# Patient Record
Sex: MALE | Race: WHITE
[De-identification: names, ages, dates, MRNs, and addresses within clinical notes are randomized per-mention and may not be internally consistent; named-entity substitution may affect disease eponyms.]

---

## 2019-06-13 ENCOUNTER — HOSPITAL ENCOUNTER (EMERGENCY)
Dept: HOSPITAL 56 - MW.ED | Age: 49
Discharge: HOME | End: 2019-06-13
Payer: SELF-PAY

## 2019-06-13 DIAGNOSIS — L30.9: Primary | ICD-10-CM

## 2019-06-13 NOTE — EDM.PDOC
ED HPI GENERAL MEDICAL PROBLEM





- General


Chief Complaint: Skin Complaint


Stated Complaint: RASH


Time Seen by Provider: 06/13/19 20:59


Source of Information: Reports: Patient


History Limitations: Reports: No Limitations





- History of Present Illness


INITIAL COMMENTS - FREE TEXT/NARRATIVE: 


HISTORY AND PHYSICAL:





History of present illness:


Patient is a 48-year-old male who presents to the ED today with concern of a 

generalized rash that occurred over the past 3 days. Patient states he has 

recently moved to a new apartment that had a bed already in it. Patient states 

since leaving into his apartment is from the rash had started a few days ago. 

Patient states he is seen bedbugs in his bed and is concerned that these are 

bed bug bites. Patient states he had told the apartment  who had 

came in and did a self treatment for bedbugs. Patient denies any other symptoms 

at this time or any other concerns.





Patient denies fever, chills, chest pain, shortness of breath, or cough. Denies 

headache, neck stiff ness, change in vision, syncope, or near syncope. Denies 

nausea, vomiting, abdominal pain, diarrhea, constipation, or dysuria. Has not 

noted any blood in urine or stool. Patient has been eating and drinking 

appropriately.





Review of systems: 


As per history of present illness and below otherwise all systems reviewed and 

negative.





Past medical history: 


As per history of present illness and as reviewed below otherwise 

noncontributory.





Surgical history: 


As per history of present illness and as reviewed below otherwise 

noncontributory.





Social history: 


See social history for further information





Family history: 


As per history of present illness and as reviewed below otherwise 

noncontributory.





Physical exam:


General: Patient is alert, oriented, and in no acute distress. Patient sitting 

comfortably on exam table.


HEENT: Atraumatic, normocephalic, pupils equal and reactive bilaterally, 

negative for conjunctival pallor or scleral icterus, mucous membranes moist, 

TMs normal bilaterally, throat clear, neck supple, nontender, trachea midline. 

No drooling or trismus noted. No meningeal signs. No hot potato voice noted. 


Lungs: Clear to auscultation, breath sounds equal bilaterally, chest nontender.


Heart: S1S2, regular rate and rhythm without overt murmur


Abdomen: Soft, nondistended, nontender. Negative for masses or 

hepatosplenomegaly. Negative for costovertebral tenderness.


Pelvis: Stable nontender.


Genitourinary: Deferred.


Rectal: Deferred.


Skin: Generalized macular papular dermatitis of the complete body. No signs of 

infection, edema, or breakage of the skin.


Extremities: Atraumatic, negative for cords or calf pain. Neurovascular 

unremarkable.


Neuro: Awake, alert, oriented. Cranial nerves II through XII unremarkable. 

Cerebellum unremarkable. Motor and sensory unremarkable throughout. Exam 

nonfocal.





Notes:


Discussed the importance for follow-up with primary care provider and the need 

to give her to the bedbugs properly. Voices understanding and is agreeable to 

plan of care. Denies any further questions or concerns at this time.





Diagnostics:


None





Therapeutics:


None





Prescription:


None





Impression: 


Dermatitis, unspecified


Possible bed bug exposure





Plan:


1. Alternate ibuprofen and Tylenol as directed for pain and discomfort.


2. Exterminate for bedbugs as discussed. Follow up with her primary care 

provider as discussed. Return to the ED as needed and as discussed.





Definitive disposition and diagnosis as appropriate pending reevaluation and 

review of above.





  ** Generalized


Pain Score (Numeric/FACES): 5





- Related Data


 Allergies











Allergy/AdvReac Type Severity Reaction Status Date / Time


 


No Known Allergies Allergy   Verified 06/13/19 20:55











Home Meds: 


 Home Meds





. [No Known Home Meds]  10/30/18 [History]











Past Medical History





- Past Health History


Medical/Surgical History: Denies Medical/Surgical History





- Infectious Disease History


Infectious Disease History: Reports: Chicken Pox





Social & Family History





- Family History


Family Medical History: Noncontributory





- Tobacco Use


Smoking Status *Q: Current Every Day Smoker


Years of Tobacco use: 33


Packs/Tins Daily: 1





- Caffeine Use


Caffeine Use: Reports: Coffee





- Recreational Drug Use


Recreational Drug Use: No





ED ROS GENERAL





- Review of Systems


Review Of Systems: ROS reveals no pertinent complaints other than HPI.





ED EXAM, SKIN/RASH


Exam: See Below (See dictation)





Course





- Vital Signs


Last Recorded V/S: 


 Last Vital Signs











Temp  36.4 C   06/13/19 21:20


 


Pulse  80   06/13/19 21:20


 


Resp  18   06/13/19 21:20


 


BP  112/60   06/13/19 21:20


 


Pulse Ox  96   06/13/19 21:20














Departure





- Departure


Time of Disposition: 21:12


Disposition: Home, Self-Care 01


Clinical Impression: 


 Dermatitis








- Discharge Information


Instructions:  Bedbugs, Easy-to-Read


Referrals: 


PCP,None [Primary Care Provider] - 


Forms:  ED Department Discharge


Additional Instructions: 


The following information is given to patients seen in the emergency department 

who are being discharged to home. This information is to outline your options 

for follow-up care. We provide all patients seen in our emergency department 

with a follow-up referral.





The need for follow-up, as well as the timing and circumstances, are variable 

depending upon the specifics of your emergency department visit.





If you don't have a primary care physician on staff, we will provide you with a 

referral. We always advise you to contact your personal physician following an 

emergency department visit to inform them of the circumstance of the visit and 

for follow-up with them and/or the need for any referrals to a consulting 

specialist.





The emergency department will also refer you to a specialist when appropriate. 

This referral assures that you have the opportunity for follow-up care with a 

specialist. All of these measure are taken in an effort to provide you with 

optimal care, which includes your follow-up.





Under all circumstances we always encourage you to contact your private 

physician who remains a resource for coordinating your care. When calling for 

follow-up care, please make the office aware that this follow-up is from your 

recent emergency room visit. If for any reason you are refused follow-up, 

please contact the [facility] Emergency 

Department at [phone] and asked to speak to the emergency department 

charge nurse.





[facility]


Primary Care


[address] [place] [address]


Phone: [phone]


Fax: [phone]





[facility]


[address] [place] [address]


Phone: [phone]


Fax: [phone]





1. Alternate ibuprofen and Tylenol as directed for pain and discomfort.


2. Exterminate for bedbugs as discussed. Follow up with her primary care 

provider as discussed. Return to the ED as needed and as discussed.

## 2019-09-23 ENCOUNTER — HOSPITAL ENCOUNTER (EMERGENCY)
Dept: HOSPITAL 56 - MW.ED | Age: 49
Discharge: HOME | End: 2019-09-23
Payer: SELF-PAY

## 2019-09-23 DIAGNOSIS — F17.210: ICD-10-CM

## 2019-09-23 DIAGNOSIS — J40: Primary | ICD-10-CM

## 2019-09-23 LAB
BUN SERPL-MCNC: 12 MG/DL (ref 7–18)
CHLORIDE SERPL-SCNC: 104 MMOL/L (ref 98–107)
CO2 SERPL-SCNC: 27.3 MMOL/L (ref 21–32)
GLUCOSE SERPL-MCNC: 87 MG/DL (ref 74–106)
POTASSIUM SERPL-SCNC: 3.9 MMOL/L (ref 3.5–5.1)
SODIUM SERPL-SCNC: 140 MMOL/L (ref 136–148)

## 2019-09-23 PROCEDURE — 87040 BLOOD CULTURE FOR BACTERIA: CPT

## 2019-09-23 PROCEDURE — 81003 URINALYSIS AUTO W/O SCOPE: CPT

## 2019-09-23 PROCEDURE — 99285 EMERGENCY DEPT VISIT HI MDM: CPT

## 2019-09-23 PROCEDURE — 85025 COMPLETE CBC W/AUTO DIFF WBC: CPT

## 2019-09-23 PROCEDURE — 85610 PROTHROMBIN TIME: CPT

## 2019-09-23 PROCEDURE — 71045 X-RAY EXAM CHEST 1 VIEW: CPT

## 2019-09-23 PROCEDURE — 36415 COLL VENOUS BLD VENIPUNCTURE: CPT

## 2019-09-23 PROCEDURE — 85379 FIBRIN DEGRADATION QUANT: CPT

## 2019-09-23 PROCEDURE — 84484 ASSAY OF TROPONIN QUANT: CPT

## 2019-09-23 PROCEDURE — 80053 COMPREHEN METABOLIC PANEL: CPT

## 2019-09-23 PROCEDURE — 96360 HYDRATION IV INFUSION INIT: CPT

## 2019-09-23 NOTE — EDM.PDOC
ED HPI GENERAL MEDICAL PROBLEM





- General


Chief Complaint: Chest Pain


Stated Complaint: PT HAS COLD


Time Seen by Provider: 09/23/19 18:15





- History of Present Illness


INITIAL COMMENTS - FREE TEXT/NARRATIVE: 





HISTORY AND PHYSICAL:





History of present illness:


Patient's 49-year-old male presents with concern of cough and cold symptoms 

along with right-sided chest pain worse with coughing over last several weeks 

he equivocates regarding any shortness of breath and no diaphoresis 

palpitations nausea vomiting. He denies fever chills he does have a 30-pack-

year history of smoking





Review of systems: 


As per history of present illness and below otherwise all systems reviewed and 

negative.





Past medical history: 


As per history of present illness and as reviewed below otherwise 

noncontributory.





Surgical history: 


As per history of present illness and as reviewed below otherwise 

noncontributory.





Social history: 


No reported history of drug or alcohol abuse.





Family history: 


As per history of present illness and as reviewed below otherwise 

noncontributory.





Physical exam:


HEENT: Atraumatic, normocephalic, pupils reactive, negative for conjunctival 

pallor or scleral icterus, mucous membranes moist, throat clear, neck supple, 

nontender, trachea midline.


Lungs: Clear to auscultation, breath sounds equal bilaterally, chest nontender.


Heart: S1S2, regular, negative for clicks, rubs, or JVD.


Abdomen: Soft, nondistended, nontender. Negative for masses or 

hepatosplenomegaly. Negative for costovertebral tenderness.


Pelvis: Stable nontender.


Genitourinary: Deferred.


Rectal: Deferred.


Extremities: Atraumatic, negative for cords or calf pain. Neurovascular 

unremarkable.


Neuro: Awake, alert, oriented. Cranial nerves II through XII unremarkable. 

Cerebellum unremarkable. Motor and sensory unremarkable throughout. Exam 

nonfocal.





Diagnostics:


CBC CMP troponin PT/INR d-dimer chest x-ray EKG





Therapeutics:


Saline at 125 mL an hour cardiac monitor





Impression: 


#1 tracheobronchitis





Definitive disposition and diagnosis as appropriate pending reevaluation and 

review of above.


  ** Right Upper Chest


Pain Score (Numeric/FACES): 7





- Related Data


 Allergies











Allergy/AdvReac Type Severity Reaction Status Date / Time


 


No Known Allergies Allergy   Verified 09/23/19 18:19











Home Meds: 


 Home Meds





. [No Known Home Meds]  10/30/18 [History]











Past Medical History





- Past Health History


Medical/Surgical History: Denies Medical/Surgical History





- Infectious Disease History


Infectious Disease History: Reports: Chicken Pox





Social & Family History





- Family History


Family Medical History: Noncontributory





- Tobacco Use


Smoking Status *Q: Current Every Day Smoker


Years of Tobacco use: 30


Packs/Tins Daily: 1





- Caffeine Use


Caffeine Use: Reports: Coffee





- Recreational Drug Use


Recreational Drug Use: Yes


Drug Use in Last 12 Months: Yes


Recreational Drug Type: Reports: Marijuana/Hashish


Recreational Drug Use Frequency: Socially





ED ROS GENERAL





- Review of Systems


Review Of Systems: ROS reveals no pertinent complaints other than HPI.





ED EXAM, GENERAL





- Physical Exam


Exam: See Below (See dictation)





Course





- Vital Signs


Last Recorded V/S: 


 Last Vital Signs











Temp  36.7 C   09/23/19 18:16


 


Pulse  84   09/23/19 18:39


 


Resp  18   09/23/19 18:39


 


BP  135/91 H  09/23/19 18:39


 


Pulse Ox  96   09/23/19 18:39














- Orders/Labs/Meds


Orders: 


 Active Orders 24 hr











 Category Date Time Status


 


 Cardiac Monitoring [RC] .AS DIRECTED Care  09/23/19 18:17 Active


 


 EKG Documentation Completion [RC] STAT Care  09/23/19 18:17 Active


 


 Pulse Oximetry [RC] ASDIRECTED Care  09/23/19 18:17 Active


 


 CULTURE BLOOD [BC] Stat Lab  09/23/19 18:30 Received


 


 CULTURE BLOOD [BC] Stat Lab  09/23/19 18:43 Received


 


 Sodium Chloride 0.9% [Normal Saline] 1,000 ml Med  09/23/19 18:30 Active





 IV STAT   


 


 Sodium Chloride 0.9% [Saline Flush] Med  09/23/19 18:19 Active





 10 ml FLUSH ASDIRECTED PRN   


 


 Sodium Chloride 0.9% [Saline Flush] Med  09/23/19 18:19 Active





 2.5 ml FLUSH ASDIRECTED PRN   


 


 Blood Culture x2 Reflex Set [OM.PC] Stat Oth  09/23/19 18:18 Ordered


 


 Saline Lock Insert [OM.PC] Stat Oth  09/23/19 18:17 Ordered








 Medication Orders





Sodium Chloride (Normal Saline)  1,000 mls @ 125 mls/hr IV STAT MURALI


   Last Admin: 09/23/19 18:34  Dose: 125 mls/hr


Sodium Chloride (Saline Flush)  10 ml FLUSH ASDIRECTED PRN


   PRN Reason: Keep Vein Open


Sodium Chloride (Saline Flush)  2.5 ml FLUSH ASDIRECTED PRN


   PRN Reason: Keep Vein Open








Labs: 


 Laboratory Tests











  09/23/19 09/23/19 09/23/19 Range/Units





  18:18 18:30 18:30 


 


WBC   9.32   (4.0-11.0)  K/uL


 


RBC   4.99   (4.50-5.90)  M/uL


 


Hgb   16.0   (13.0-17.0)  g/dL


 


Hct   46.3   (38.0-50.0)  %


 


MCV   92.8   (80.0-98.0)  fL


 


MCH   32.1 H   (27.0-32.0)  pg


 


MCHC   34.6   (31.0-37.0)  g/dL


 


RDW Std Deviation   50.1   (28.0-62.0)  fl


 


RDW Coeff of Ana Paula   15   (11.0-15.0)  %


 


Plt Count   211   (150-400)  K/uL


 


MPV   9.60   (7.40-12.00)  fL


 


Neut % (Auto)   56.9   (48.0-80.0)  %


 


Lymph % (Auto)   32.8   (16.0-40.0)  %


 


Mono % (Auto)   6.9   (0.0-15.0)  %


 


Eos % (Auto)   3.2   (0.0-7.0)  %


 


Baso % (Auto)   0.2   (0.0-1.5)  %


 


Neut # (Auto)   5.3   (1.4-5.7)  K/uL


 


Lymph # (Auto)   3.1 H   (0.6-2.4)  K/uL


 


Mono # (Auto)   0.6   (0.0-0.8)  K/uL


 


Eos # (Auto)   0.3   (0.0-0.7)  K/uL


 


Baso # (Auto)   0.0   (0.0-0.1)  K/uL


 


Nucleated RBC %   0.0   /100WBC


 


Nucleated RBCs #   0   K/uL


 


INR     


 


D-Dimer, Quantitative    0.25  (0.0-0.50)  mg/L FEU


 


Sodium     (136-148)  mmol/L


 


Potassium     (3.5-5.1)  mmol/L


 


Chloride     ()  mmol/L


 


Carbon Dioxide     (21.0-32.0)  mmol/L


 


BUN     (7.0-18.0)  mg/dL


 


Creatinine     (0.8-1.3)  mg/dL


 


Est Cr Clr Drug Dosing     mL/min


 


Estimated GFR (MDRD)     ml/min


 


Glucose     ()  mg/dL


 


Calcium     (8.5-10.1)  mg/dL


 


Total Bilirubin     (0.2-1.0)  mg/dL


 


AST     (15-37)  IU/L


 


ALT     (14-63)  IU/L


 


Alkaline Phosphatase     ()  U/L


 


Troponin I     (0.000-0.056)  ng/mL


 


Total Protein     (6.4-8.2)  g/dL


 


Albumin     (3.4-5.0)  g/dL


 


Globulin     (2.6-4.0)  g/dL


 


Albumin/Globulin Ratio     (0.9-1.6)  


 


Urine Color  YELLOW    


 


Urine Appearance  CLEAR    


 


Urine pH  6.0    (5.0-8.0)  


 


Ur Specific Gravity  1.020    (1.001-1.035)  


 


Urine Protein  NEGATIVE    (NEGATIVE)  mg/dL


 


Urine Glucose (UA)  NEGATIVE    (NEGATIVE)  mg/dL


 


Urine Ketones  NEGATIVE    (NEGATIVE)  mg/dL


 


Urine Occult Blood  NEGATIVE    (NEGATIVE)  


 


Urine Nitrite  NEGATIVE    (NEGATIVE)  


 


Urine Bilirubin  NEGATIVE    (NEGATIVE)  


 


Urine Urobilinogen  0.2    (<2.0)  EU/dL


 


Ur Leukocyte Esterase  NEGATIVE    (NEGATIVE)  














  09/23/19 09/23/19 Range/Units





  18:30 18:30 


 


WBC    (4.0-11.0)  K/uL


 


RBC    (4.50-5.90)  M/uL


 


Hgb    (13.0-17.0)  g/dL


 


Hct    (38.0-50.0)  %


 


MCV    (80.0-98.0)  fL


 


MCH    (27.0-32.0)  pg


 


MCHC    (31.0-37.0)  g/dL


 


RDW Std Deviation    (28.0-62.0)  fl


 


RDW Coeff of Ana Paula    (11.0-15.0)  %


 


Plt Count    (150-400)  K/uL


 


MPV    (7.40-12.00)  fL


 


Neut % (Auto)    (48.0-80.0)  %


 


Lymph % (Auto)    (16.0-40.0)  %


 


Mono % (Auto)    (0.0-15.0)  %


 


Eos % (Auto)    (0.0-7.0)  %


 


Baso % (Auto)    (0.0-1.5)  %


 


Neut # (Auto)    (1.4-5.7)  K/uL


 


Lymph # (Auto)    (0.6-2.4)  K/uL


 


Mono # (Auto)    (0.0-0.8)  K/uL


 


Eos # (Auto)    (0.0-0.7)  K/uL


 


Baso # (Auto)    (0.0-0.1)  K/uL


 


Nucleated RBC %    /100WBC


 


Nucleated RBCs #    K/uL


 


INR   0.91  


 


D-Dimer, Quantitative    (0.0-0.50)  mg/L FEU


 


Sodium  140   (136-148)  mmol/L


 


Potassium  3.9   (3.5-5.1)  mmol/L


 


Chloride  104   ()  mmol/L


 


Carbon Dioxide  27.3   (21.0-32.0)  mmol/L


 


BUN  12   (7.0-18.0)  mg/dL


 


Creatinine  1.2   (0.8-1.3)  mg/dL


 


Est Cr Clr Drug Dosing  85.08   mL/min


 


Estimated GFR (MDRD)  > 60.0   ml/min


 


Glucose  87   ()  mg/dL


 


Calcium  8.7   (8.5-10.1)  mg/dL


 


Total Bilirubin  0.4   (0.2-1.0)  mg/dL


 


AST  21   (15-37)  IU/L


 


ALT  32   (14-63)  IU/L


 


Alkaline Phosphatase  58   ()  U/L


 


Troponin I  < 0.050   (0.000-0.056)  ng/mL


 


Total Protein  7.6   (6.4-8.2)  g/dL


 


Albumin  3.8   (3.4-5.0)  g/dL


 


Globulin  3.8   (2.6-4.0)  g/dL


 


Albumin/Globulin Ratio  1.0   (0.9-1.6)  


 


Urine Color    


 


Urine Appearance    


 


Urine pH    (5.0-8.0)  


 


Ur Specific Gravity    (1.001-1.035)  


 


Urine Protein    (NEGATIVE)  mg/dL


 


Urine Glucose (UA)    (NEGATIVE)  mg/dL


 


Urine Ketones    (NEGATIVE)  mg/dL


 


Urine Occult Blood    (NEGATIVE)  


 


Urine Nitrite    (NEGATIVE)  


 


Urine Bilirubin    (NEGATIVE)  


 


Urine Urobilinogen    (<2.0)  EU/dL


 


Ur Leukocyte Esterase    (NEGATIVE)  











Meds: 


Medications











Generic Name Dose Route Start Last Admin





  Trade Name Freq  PRN Reason Stop Dose Admin


 


Sodium Chloride  1,000 mls @ 125 mls/hr  09/23/19 18:30  09/23/19 18:34





  Normal Saline  IV   125 mls/hr





  STAT MURALI   Administration





     





     





     





     


 


Sodium Chloride  10 ml  09/23/19 18:19  





  Saline Flush  FLUSH   





  ASDIRECTED PRN   





  Keep Vein Open   





     





     





     


 


Sodium Chloride  2.5 ml  09/23/19 18:19  





  Saline Flush  FLUSH   





  ASDIRECTED PRN   





  Keep Vein Open   





     





     





     














Departure





- Departure


Time of Disposition: 19:26


Disposition: Home, Self-Care 01


Condition: Good


Clinical Impression: 


 Tracheobronchitis








- Discharge Information


Forms:  ED Department Discharge


Additional Instructions: 


The following information is given to patients seen in the emergency department 

who are being discharged to home. This information is to outline your options 

for follow-up care. We provide all patients seen in our emergency department 

with a follow-up referral.





The need for follow-up, as well as the timing and circumstances, are variable 

depending upon the specifics of your emergency department visit.





If you don't have a primary care physician on staff, we will provide you with a 

referral. We always advise you to contact your personal physician following an 

emergency department visit to inform them of the circumstance of the visit and 

for follow-up with them and/or the need for any referrals to a consulting 

specialist.





The emergency department will also refer you to a specialist when appropriate. 

This referral assures that you have the opportunity for followup care with a 

specialist. All of these measure are taken in an effort to provide you with 

optimal care, which includes your followup.





Under all circumstances we always encourage you to contact your private 

physician who remains a resource for coordinating  your care. When calling for 

followup care, please make the office aware that this follow-up is from your 

recent emergency room visit. If for any reason you are refused follow-up, 

please contact the [facility] emergency department at [phone] 

and asked to speak to the emergency department charge nurse.




















Z-Yao albuterol as prescribed follow-up primary medical doctor as discussed 

stop smoking return as needed as discussed





- My Orders


Last 24 Hours: 


My Active Orders





09/23/19 18:17


Cardiac Monitoring [RC] .AS DIRECTED 


EKG Documentation Completion [RC] STAT 


Pulse Oximetry [RC] ASDIRECTED 


Saline Lock Insert [OM.PC] Stat 





09/23/19 18:18


Blood Culture x2 Reflex Set [OM.PC] Stat 





09/23/19 18:19


Sodium Chloride 0.9% [Saline Flush]   10 ml FLUSH ASDIRECTED PRN 


Sodium Chloride 0.9% [Saline Flush]   2.5 ml FLUSH ASDIRECTED PRN 





09/23/19 18:30


CULTURE BLOOD [BC] Stat 


Sodium Chloride 0.9% [Normal Saline] 1,000 ml IV STAT 





09/23/19 18:43


CULTURE BLOOD [BC] Stat 














- Assessment/Plan


Last 24 Hours: 


My Active Orders





09/23/19 18:17


Cardiac Monitoring [RC] .AS DIRECTED 


EKG Documentation Completion [RC] STAT 


Pulse Oximetry [RC] ASDIRECTED 


Saline Lock Insert [OM.PC] Stat 





09/23/19 18:18


Blood Culture x2 Reflex Set [OM.PC] Stat 





09/23/19 18:19


Sodium Chloride 0.9% [Saline Flush]   10 ml FLUSH ASDIRECTED PRN 


Sodium Chloride 0.9% [Saline Flush]   2.5 ml FLUSH ASDIRECTED PRN 





09/23/19 18:30


CULTURE BLOOD [BC] Stat 


Sodium Chloride 0.9% [Normal Saline] 1,000 ml IV STAT 





09/23/19 18:43


CULTURE BLOOD [BC] Stat

## 2019-09-23 NOTE — CR
Indication:



Chest pain.



Technique:



A single AP portable view of the chest was obtained.



Comparison:



None



Findings:



The heart is normal in size. The lungs are clear. No infiltrate, pleural 

effusion, or pneumothorax is identified.



Impression:



No acute cardiopulmonary process.



Dictated by Amrita Wallace MD @ Sep 23 2019  6:56PM



Signed by Dr. Amrita Wallace @ Sep 23 2019  6:57PM

## 2020-11-19 NOTE — EDM.PDOC
ED HPI GENERAL MEDICAL PROBLEM





- General


Chief Complaint: Respiratory Problem


Stated Complaint: SOB CHEST PAIN


Time Seen by Provider: 11/19/20 11:33


Source of Information: Reports: Patient


History Limitations: Reports: No Limitations





- History of Present Illness


INITIAL COMMENTS - FREE TEXT/NARRATIVE: 


HISTORY AND PHYSICAL:





History of present illness:


Patient is a 50-year-old male who presents to the emergency room with complaints

of shortness of breath, chest pain, fatigue and diarrhea.  He started feeling 

generally unwell on Monday, 11/16/20 and was sent home from work.  At that time 

he went to the [employer] for COVID-19 screening.  He does not have results on 

these and has been staying home since then.  Over the past 2 days he has had 

shortness of breath, diarrhea and fatigue.  He has had some night sweats and 

chills, has not noticed it much during the day.  He states last night he started

to develop chest pain which was worrisome to him and he came to the emergency 

room.  The chest pain does not worsen or improve with physical activity or rest,

it does not radiate.  He has no personal history of any lung or cardiac 

problems, although states he is a chronic smoker.  Patient denies any fever, 

chills, headache, change in vision, syncope or near syncope. Denies any back 

pain, abdominal pain, nausea, vomiting, constipation or dysuria. Has not noted 

any blood in urine or stool. Patient has been eating and drinking appropriately.





Review of systems: 


As per history of present illness and below otherwise all systems reviewed and 

negative.





Past medical history: 


As per history of present illness and as reviewed below otherwise 

noncontributory.





Surgical history: 


As per history of present illness and as reviewed below otherwise 

noncontributory.





Social history: 


See social history for further information





Family history: 


As per history of present illness and as reviewed below otherwise 

noncontributory.





Physical exam:


General: Well developed and well nourished 50-year-old male. Alert and 

orientated x 3. Nontoxic in appearance and in no acute distress. Vital signs are

stable and have been reviewed by me. Nursing notes were reviewed. 


HEENT: Atraumatic, normocephalic, pupils equal and reactive bilaterally, 

negative for conjunctival pallor or scleral icterus, mucous membranes moist, TMs

normal bilaterally, throat clear, neck supple, nontender, trachea midline. No 

drooling or trismus noted. No meningeal signs. No hot potato voice noted. 


Lungs: Clear to auscultation, breath sounds equal bilaterally, chest nontender. 

Normal work of breathing, no accessory muscles used.


Heart: S1S2, regular rate and rhythm without overt murmur


Abdomen: Soft, nondistended, nontender. Negative for masses or 

hepatosplenomegaly. Negative for costovertebral tenderness.


Pelvis: Stable nontender.


Skin: Intact, warm, dry. No lesions or rashes noted.


Hematologic: No petechiae or purpra. Mucosa appropriate color and normal nail 

bed color and refill.


Extremities: Atraumatic, moves all extremities per self without difficulty or 

deficits, negative for cords or calf pain. Neurovascular unremarkable.


Neuro: Awake, alert, oriented. Cranial nerves II through XII unremarkable. 

Cerebellum unremarkable. Motor and sensory unremarkable throughout. Exam 

nonfocal.


Psychiatric: Mood and affect are appropriate.  Normal thought process. Answering

questions appropriately.





Notes:


EKG shows sinus tachycardia with rate of 103, no concern for STEMI. Lab work is 

unremarkable.  Normal D-dimer.  Chest x-ray is unremarkable. HEART Score is 2, 

Low. Negative influenza and COVID.  Does have a majority of the symptoms 

associated with COVID-19 I will have him stay home until he receives his state 

testing results.  I have talked with the patient about today's findings, in 

addition to providing specific details for plan of care.  Reassessment at the 

time of disposition demonstrates that the patient is in no acute distress.  The 

patient is stable for discharge, counseling was provided and we discussed in 

great detail signs and symptoms that would prompt them to return to the 

Emergency Department. Medication, follow up and supportive care measures were 

reviewed and discussed. Voices understanding and is agreeable to plan of care. 

Denies any further questions or concerns at this time.





Diagnostics:


CBC, CMP, Troponin, EKG, CXR, COVID





Therapeutics:


ASA





Prescription:


Prednisone, Pro-Air, Phenergan with codeine





Impression: 


Viral illness





Plan:


1.  Your COVID-19 screening is negative.  If you are not in close contact to 

someone who is positive, you should continue to practice physical distancing and

limit your interactions with others as much as possible. 


2.  COVID-19 testing is not 100% accurate, we did do a [place] send out which will

take a few days to get results.


3. You can take NyQuil during the evening to help get a restful night sleep. 

Alternate Tylenol and ibuprofen as needed for pain and fever management.


4. The [facility] Hotline phone number [phone], They are open Monday - 

Friday 7am - 7pm. 


5. Follow up with your primary care provider for re-evaluation and if your 

symptoms should worsen, new symptoms develop or you feel like you are not 

improving you are always welcome to return to the emergency room. 





Definitive disposition and diagnosis as appropriate pending reevaluation and 

review of above.





  ** left chest


Pain Score (Numeric/FACES): 3





- Related Data


                                    Allergies











Allergy/AdvReac Type Severity Reaction Status Date / Time


 


No Known Allergies Allergy   Verified 11/19/20 11:48











Home Meds: 


                                    Home Meds





Albuterol Sulfate [Proair Hfa] 2 puff IH Q4H PRN #1 hfa.aer.ad 11/19/20 [Rx]


predniSONE [Prednisone] 40 mg PO DAILY 4 Days #8 tablet 11/19/20 [Rx]











Past Medical History





- Past Health History


Medical/Surgical History: Denies Medical/Surgical History





- Infectious Disease History


Infectious Disease History: Reports: Chicken Pox





Social & Family History





- Family History


Family Medical History: No Pertinent Family History





- Caffeine Use


Caffeine Use: Reports: Coffee





ED ROS GENERAL





- Review of Systems


Review Of Systems: Comprehensive ROS is negative, except as noted in HPI.





ED EXAM, GENERAL





- Physical Exam


Exam: See Below (See dictation)





Course





- Vital Signs


Last Recorded V/S: 


                                Last Vital Signs











Temp  98.6 F   11/19/20 11:38


 


Pulse  100   11/19/20 11:38


 


Resp  16   11/19/20 11:38


 


BP  155/106 H  11/19/20 11:38


 


Pulse Ox  98   11/19/20 11:38














- Orders/Labs/Meds


Orders: 


                               Active Orders 24 hr











 Category Date Time Status


 


 EKG Documentation Completion [RC] STAT Care  11/19/20 11:43 Active


 


 Sodium Chloride 0.9% [Saline Flush] Med  11/19/20 11:43 Active





 10 ml FLUSH ASDIRECTED PRN   


 


 Sodium Chloride 0.9% [Saline Flush] Med  11/19/20 11:43 Active





 2.5 ml FLUSH ASDIRECTED PRN   


 


 Isolation [COMM] Routine Oth  11/19/20 11:50 Active


 


 Saline Lock Insert [OM.PC] Stat Oth  11/19/20 11:43 Ordered








                                Medication Orders





Sodium Chloride (Saline Flush)  10 ml FLUSH ASDIRECTED PRN


   PRN Reason: Keep Vein Open


   Last Admin: 11/19/20 11:47  Dose: 10 ml


   Documented by: [id number]


Sodium Chloride (Saline Flush)  2.5 ml FLUSH ASDIRECTED PRN


   PRN Reason: Keep Vein Open


   Last Admin: 11/19/20 11:47  Dose: 2.5 ml


   Documented by: [id number]








Labs: 


                                Laboratory Tests











  11/19/20 11/19/20 11/19/20 Range/Units





  11:35 11:35 11:35 


 


WBC  9.86    (4.0-11.0)  K/uL


 


RBC  5.72    (4.50-5.90)  M/uL


 


Hgb  18.4 H    (13.0-17.0)  g/dL


 


Hct  54.0 H    (38.0-50.0)  %


 


MCV  94.4    (80.0-98.0)  fL


 


MCH  32.2 H    (27.0-32.0)  pg


 


MCHC  34.1    (31.0-37.0)  g/dL


 


RDW Std Deviation  46.8    (28.0-62.0)  fl


 


RDW Coeff of Ana Paula  14    (11.0-15.0)  %


 


Plt Count  202    (150-400)  K/uL


 


MPV  10.00    (7.40-12.00)  fL


 


Neut % (Auto)  48.6    (48.0-80.0)  %


 


Lymph % (Auto)  40.8 H    (16.0-40.0)  %


 


Mono % (Auto)  8.2    (0.0-15.0)  %


 


Eos % (Auto)  2.1    (0.0-7.0)  %


 


Baso % (Auto)  0.3    (0.0-1.5)  %


 


Neut # (Auto)  4.8    (1.4-5.7)  K/uL


 


Lymph # (Auto)  4.0 H    (0.6-2.4)  K/uL


 


Mono # (Auto)  0.8    (0.0-0.8)  K/uL


 


Eos # (Auto)  0.2    (0.0-0.7)  K/uL


 


Baso # (Auto)  0.0    (0.0-0.1)  K/uL


 


Nucleated RBC %  0.0    /100WBC


 


Nucleated RBCs #  0    K/uL


 


D-Dimer, Quantitative   0.34   (0.0-0.50)  mg/L FEU


 


Sodium    139  (136-148)  mmol/L


 


Potassium    3.8  (3.5-5.1)  mmol/L


 


Chloride    102  ()  mmol/L


 


Carbon Dioxide    27.0  (21.0-32.0)  mmol/L


 


BUN    14  (7.0-18.0)  mg/dL


 


Creatinine    1.3  (0.8-1.3)  mg/dL


 


Est Cr Clr Drug Dosing    84.39  mL/min


 


Estimated GFR (MDRD)    58.7  ml/min


 


Glucose    75  ()  mg/dL


 


Calcium    9.0  (8.5-10.1)  mg/dL


 


Total Bilirubin    0.5  (0.2-1.0)  mg/dL


 


AST    23  (15-37)  IU/L


 


ALT    35  (14-63)  IU/L


 


Alkaline Phosphatase    56  ()  U/L


 


Troponin I    < 0.050  (0.000-0.056)  ng/mL


 


Total Protein    8.3 H  (6.4-8.2)  g/dL


 


Albumin    4.6  (3.4-5.0)  g/dL


 


Globulin    3.7  (2.6-4.0)  g/dL


 


Albumin/Globulin Ratio    1.2  (0.9-1.6)  


 


Urine Color     


 


Urine Appearance     


 


Urine pH     (5.0-8.0)  


 


Ur Specific Gravity     (1.001-1.035)  


 


Urine Protein     (NEGATIVE)  mg/dL


 


Urine Glucose (UA)     (NEGATIVE)  mg/dL


 


Urine Ketones     (NEGATIVE)  mg/dL


 


Urine Occult Blood     (NEGATIVE)  


 


Urine Nitrite     (NEGATIVE)  


 


Urine Bilirubin     (NEGATIVE)  


 


Urine Urobilinogen     (<2.0)  EU/dL


 


Ur Leukocyte Esterase     (NEGATIVE)  


 


SARS-CoV-2 RNA (NEO)     (NEGATIVE)  














  11/19/20 11/19/20 Range/Units





  12:05 12:10 


 


WBC    (4.0-11.0)  K/uL


 


RBC    (4.50-5.90)  M/uL


 


Hgb    (13.0-17.0)  g/dL


 


Hct    (38.0-50.0)  %


 


MCV    (80.0-98.0)  fL


 


MCH    (27.0-32.0)  pg


 


MCHC    (31.0-37.0)  g/dL


 


RDW Std Deviation    (28.0-62.0)  fl


 


RDW Coeff of Ana Paula    (11.0-15.0)  %


 


Plt Count    (150-400)  K/uL


 


MPV    (7.40-12.00)  fL


 


Neut % (Auto)    (48.0-80.0)  %


 


Lymph % (Auto)    (16.0-40.0)  %


 


Mono % (Auto)    (0.0-15.0)  %


 


Eos % (Auto)    (0.0-7.0)  %


 


Baso % (Auto)    (0.0-1.5)  %


 


Neut # (Auto)    (1.4-5.7)  K/uL


 


Lymph # (Auto)    (0.6-2.4)  K/uL


 


Mono # (Auto)    (0.0-0.8)  K/uL


 


Eos # (Auto)    (0.0-0.7)  K/uL


 


Baso # (Auto)    (0.0-0.1)  K/uL


 


Nucleated RBC %    /100WBC


 


Nucleated RBCs #    K/uL


 


D-Dimer, Quantitative    (0.0-0.50)  mg/L FEU


 


Sodium    (136-148)  mmol/L


 


Potassium    (3.5-5.1)  mmol/L


 


Chloride    ()  mmol/L


 


Carbon Dioxide    (21.0-32.0)  mmol/L


 


BUN    (7.0-18.0)  mg/dL


 


Creatinine    (0.8-1.3)  mg/dL


 


Est Cr Clr Drug Dosing    mL/min


 


Estimated GFR (MDRD)    ml/min


 


Glucose    ()  mg/dL


 


Calcium    (8.5-10.1)  mg/dL


 


Total Bilirubin    (0.2-1.0)  mg/dL


 


AST    (15-37)  IU/L


 


ALT    (14-63)  IU/L


 


Alkaline Phosphatase    ()  U/L


 


Troponin I    (0.000-0.056)  ng/mL


 


Total Protein    (6.4-8.2)  g/dL


 


Albumin    (3.4-5.0)  g/dL


 


Globulin    (2.6-4.0)  g/dL


 


Albumin/Globulin Ratio    (0.9-1.6)  


 


Urine Color  YELLOW   


 


Urine Appearance  CLEAR   


 


Urine pH  6.0   (5.0-8.0)  


 


Ur Specific Gravity  <= 1.005   (1.001-1.035)  


 


Urine Protein  NEGATIVE   (NEGATIVE)  mg/dL


 


Urine Glucose (UA)  NEGATIVE   (NEGATIVE)  mg/dL


 


Urine Ketones  NEGATIVE   (NEGATIVE)  mg/dL


 


Urine Occult Blood  NEGATIVE   (NEGATIVE)  


 


Urine Nitrite  NEGATIVE   (NEGATIVE)  


 


Urine Bilirubin  NEGATIVE   (NEGATIVE)  


 


Urine Urobilinogen  0.2   (<2.0)  EU/dL


 


Ur Leukocyte Esterase  NEGATIVE   (NEGATIVE)  


 


SARS-CoV-2 RNA (NEO)   NEGATIVE  (NEGATIVE)  











Meds: 


Medications











Generic Name Dose Route Start Last Admin





  Trade Name Freq  PRN Reason Stop Dose Admin


 


Sodium Chloride  10 ml  11/19/20 11:43  11/19/20 11:47





  Saline Flush  FLUSH   10 ml





  ASDIRECTED PRN   Administration





  Keep Vein Open  


 


Sodium Chloride  2.5 ml  11/19/20 11:43  11/19/20 11:47





  Saline Flush  FLUSH   2.5 ml





  ASDIRECTED PRN   Administration





  Keep Vein Open  














Discontinued Medications














Generic Name Dose Route Start Last Admin





  Trade Name Freq  PRN Reason Stop Dose Admin


 


Aspirin  324 mg  11/19/20 12:02  11/19/20 12:39





  Aspirin  PO  11/19/20 12:03  324 mg





  ONETIME ONE   Administration














Departure





- Departure


Time of Disposition: 13:59


Disposition: Home, Self-Care 01


Clinical Impression: 


 Viral illness








- Discharge Information


Prescriptions: 


predniSONE [Prednisone] 40 mg PO DAILY 4 Days #8 tablet


Albuterol Sulfate [Proair Hfa] 2 puff IH Q4H PRN #1 hfa.aer.ad


 PRN Reason: Dyspnea


Instructions:  Viral Respiratory Infection, Easy-To-Read


Referrals: 


PCP,None [Primary Care Provider] - 


Forms:  ED Department Discharge


Additional Instructions: 


The following information is given to patients seen in the emergency department 

who are being discharged to home. This information is to outline your options 

for follow-up care. We provide all patients seen in our emergency department 

with a follow-up referral.





The need for follow-up, as well as the timing and circumstances, are variable 

depending upon the specifics of your emergency department visit.





If you don't have a primary care physician on staff, we will provide you with a 

referral. We always advise you to contact your personal physician following an 

emergency department visit to inform them of the circumstance of the visit and 

for follow-up with them and/or the need for any referrals to a consulting 

specialist.





The emergency department will also refer you to a specialist when appropriate. 

This referral assures that you have the opportunity for follow-up care with a 

specialist. All of these measure are taken in an effort to provide you with 

optimal care, which includes your follow-up.





Under all circumstances we always encourage you to contact your private 

physician who remains a resource for coordinating your care. When calling for 

follow-up care, please make the office aware that this follow-up is from your 

recent emergency room visit. If for any reason you are refused follow-up, please

contact the [facility] Emergency Department

at [phone] and asked to speak to the emergency department charge nurse.





[facility]


Primary Care


[address] [place] [address]


Phone: [phone]


Fax: [phone]





[address] [address]


Phone: [phone]


Fax: [phone]





Thank you for choosing the [facility] emergency department in 

[place] for your medical needs today.  It was a pleasure caring for you. Today

you were seen in the emergency department for COVID type symptoms.





1.  Your COVID-19 screening is negative.  Your basic lab work, EKG, CXR, 

D.Dimer, and cardiac enzymes were normal. If you are not in close contact to 

someone who is positive, you should continue to practice physical distancing and

limit your interactions with others as much as possible. 


2.  COVID-19 testing is not 100% accurate, we did do a [place] send out which will

take a few days to get results.


3. You can take NyQuil during the evening to help get a restful night sleep. 

Alternate Tylenol and ibuprofen as needed for pain and fever management.


4. The [place] 19 Hotline phone number [phone], They are open Monday - 

Friday 7am - 7pm. 


5. Follow up with your primary care provider for re-evaluation and if your 

symptoms should worsen, new symptoms develop or you feel like you are not 

improving you are always welcome to return to the emergency room. 





Sepsis Event Note (ED)





- Focused Exam


Vital Signs: 


                                   Vital Signs











  Temp Pulse Resp BP Pulse Ox


 


 11/19/20 11:38  98.6 F  100  16  155/106 H  98














- My Orders


Last 24 Hours: 


My Active Orders





11/19/20 11:43


EKG Documentation Completion [RC] STAT 


Sodium Chloride 0.9% [Saline Flush]   10 ml FLUSH ASDIRECTED PRN 


Sodium Chloride 0.9% [Saline Flush]   2.5 ml FLUSH ASDIRECTED PRN 


Saline Lock Insert [OM.PC] Stat 





11/19/20 11:50


Isolation [COMM] Routine 














- Assessment/Plan


Last 24 Hours: 


My Active Orders





11/19/20 11:43


EKG Documentation Completion [RC] STAT 


Sodium Chloride 0.9% [Saline Flush]   10 ml FLUSH ASDIRECTED PRN 


Sodium Chloride 0.9% [Saline Flush]   2.5 ml FLUSH ASDIRECTED PRN 


Saline Lock Insert [OM.PC] Stat 





11/19/20 11:50


Isolation [COMM] Routine

## 2020-11-19 NOTE — CR
INDICATION:



Chest pain and SOB.



TECHNIQUE:



Portable AP image of the chest.



COMPARISON:



09/23/2019.



FINDINGS:



Lungs and pleural spaces clear. Heart, mediastinum and pulmonary vessels 

normal. No significant osseous abnormality.



IMPRESSION:



Negative chest.



Dictated by Adama Rios MD @ Nov 19 2020 12:31PM



Signed by Dr. Adama Rios @ Nov 19 2020 12:32PM